# Patient Record
Sex: MALE | Race: WHITE | Employment: OTHER | URBAN - METROPOLITAN AREA
[De-identification: names, ages, dates, MRNs, and addresses within clinical notes are randomized per-mention and may not be internally consistent; named-entity substitution may affect disease eponyms.]

---

## 2017-06-05 NOTE — PATIENT DISCUSSION
(H25.13) Age-related nuclear cataract, bilateral - Assesment : Examination revealed cataract. - Plan : Monitor for changes. Advised patient to call our office with decreased vision or increased symptoms.  VF Rosalia jim

## 2017-06-05 NOTE — PATIENT DISCUSSION
(H40.013) Open angle with borderline findings, low risk, bilateral - Assesment : Examination revealed suspicion for Open Angle Glaucoma. IOP BORDERLINE ELEVATED TODAY.   RECOMMEND STARTING TOPICAL TX OU TO TRY AND LOWER IOP OU. - Plan : START LATANOPROST OU QD  THURSDAY / IOP CHECK

## 2017-06-05 NOTE — PATIENT DISCUSSION
(Y83.317) Vitreous degeneration, bilateral - Assesment : Examination revealed PVD - Plan : Monitor for changes. Advised patient to call our office with decreased vision or an increase in flashes and/or floaters.

## 2017-06-09 NOTE — PATIENT DISCUSSION
(H40.013) Open angle with borderline findings, low risk, bilateral - Assesment : Examination revealed suspicion for Open Angle Glaucoma. IOP much improved since starting latanoprost, recommend continuing gtts therapy. Advised patient even though iops are stable shes still at risk for narrow kushal glaucoma Advised to go to ER if any eye pain,nausea, redness, as this can possibly an angle clousure attack. Avoid taking medication that say not to take if you have glaucoma. - Plan : Monitor for changes.  Continue latanoprost qdaily OU Rtc as scheduled in Fall for LPI OU

## 2017-10-10 ENCOUNTER — FOLLOW UP (OUTPATIENT)
Dept: URBAN - METROPOLITAN AREA CLINIC 26 | Facility: CLINIC | Age: 72
End: 2017-10-10

## 2017-10-10 VITALS — SYSTOLIC BLOOD PRESSURE: 130 MMHG | DIASTOLIC BLOOD PRESSURE: 74 MMHG | HEIGHT: 60 IN | HEART RATE: 72 BPM

## 2017-10-10 DIAGNOSIS — H35.373: ICD-10-CM

## 2017-10-10 DIAGNOSIS — H40.1132: ICD-10-CM

## 2017-10-10 DIAGNOSIS — H43.813: ICD-10-CM

## 2017-10-10 DIAGNOSIS — D31.31: ICD-10-CM

## 2017-10-10 PROCEDURE — 92014 COMPRE OPH EXAM EST PT 1/>: CPT

## 2017-10-10 PROCEDURE — 76512 OPH US DX B-SCAN: CPT

## 2017-10-10 PROCEDURE — 92250 FUNDUS PHOTOGRAPHY W/I&R: CPT

## 2017-10-10 ASSESSMENT — VISUAL ACUITY
OS_CC: 20/25-2
OD_CC: 20/20

## 2017-10-10 ASSESSMENT — TONOMETRY
OS_IOP_MMHG: 11
OD_IOP_MMHG: 10

## 2018-10-09 ENCOUNTER — FOLLOW UP (OUTPATIENT)
Dept: URBAN - METROPOLITAN AREA CLINIC 26 | Facility: CLINIC | Age: 73
End: 2018-10-09

## 2018-10-09 VITALS — BODY MASS INDEX: 24.25 KG/M2 | WEIGHT: 160 LBS | HEIGHT: 68 IN

## 2018-10-09 DIAGNOSIS — D31.31: ICD-10-CM

## 2018-10-09 DIAGNOSIS — H40.1132: ICD-10-CM

## 2018-10-09 DIAGNOSIS — H43.813: ICD-10-CM

## 2018-10-09 DIAGNOSIS — H35.373: ICD-10-CM

## 2018-10-09 PROCEDURE — 76512 OPH US DX B-SCAN: CPT

## 2018-10-09 PROCEDURE — 92250 FUNDUS PHOTOGRAPHY W/I&R: CPT

## 2018-10-09 PROCEDURE — 92014 COMPRE OPH EXAM EST PT 1/>: CPT

## 2018-10-09 ASSESSMENT — VISUAL ACUITY
OD_CC: 20/20-1
OS_CC: 20/20-2

## 2018-10-09 ASSESSMENT — TONOMETRY
OD_IOP_MMHG: 12
OS_IOP_MMHG: 11

## 2019-10-08 ENCOUNTER — FOLLOW UP (OUTPATIENT)
Dept: URBAN - METROPOLITAN AREA CLINIC 26 | Facility: CLINIC | Age: 74
End: 2019-10-08

## 2019-10-08 VITALS — HEIGHT: 68 IN | BODY MASS INDEX: 23.49 KG/M2 | WEIGHT: 155 LBS

## 2019-10-08 DIAGNOSIS — H40.1132: ICD-10-CM

## 2019-10-08 DIAGNOSIS — H35.373: ICD-10-CM

## 2019-10-08 DIAGNOSIS — H43.813: ICD-10-CM

## 2019-10-08 DIAGNOSIS — D31.31: ICD-10-CM

## 2019-10-08 PROCEDURE — 92250 FUNDUS PHOTOGRAPHY W/I&R: CPT

## 2019-10-08 PROCEDURE — 92014 COMPRE OPH EXAM EST PT 1/>: CPT

## 2019-10-08 PROCEDURE — 76512 OPH US DX B-SCAN: CPT

## 2019-10-08 ASSESSMENT — VISUAL ACUITY
OD_CC: 20/20
OS_CC: 20/20-1

## 2019-10-08 ASSESSMENT — TONOMETRY
OD_IOP_MMHG: 17
OS_IOP_MMHG: 17

## 2020-09-01 ENCOUNTER — OFFICE VISIT (OUTPATIENT)
Age: 75
End: 2020-09-01

## 2020-10-01 ENCOUNTER — OFFICE VISIT (OUTPATIENT)
Age: 75
End: 2020-10-01

## 2021-02-06 ENCOUNTER — OFFICE VISIT (OUTPATIENT)
Age: 76
End: 2021-02-06

## 2021-04-01 ENCOUNTER — OFFICE VISIT (OUTPATIENT)
Dept: URBAN - METROPOLITAN AREA CLINIC 7 | Facility: CLINIC | Age: 76
End: 2021-04-01

## 2021-04-07 ENCOUNTER — TELEPHONE ENCOUNTER (OUTPATIENT)
Dept: URBAN - METROPOLITAN AREA CLINIC 9 | Facility: CLINIC | Age: 76
End: 2021-04-07

## 2021-04-09 ENCOUNTER — OFFICE VISIT (OUTPATIENT)
Dept: URBAN - METROPOLITAN AREA SURGERY CENTER 5 | Facility: SURGERY CENTER | Age: 76
End: 2021-04-09

## 2021-04-20 ENCOUNTER — OFFICE VISIT (OUTPATIENT)
Dept: URBAN - METROPOLITAN AREA SURGERY CENTER 5 | Facility: SURGERY CENTER | Age: 76
End: 2021-04-20

## 2021-05-11 ENCOUNTER — OFFICE VISIT (OUTPATIENT)
Dept: URBAN - METROPOLITAN AREA CLINIC 7 | Facility: CLINIC | Age: 76
End: 2021-05-11

## 2021-05-26 LAB — PANCREATIC ELASTASE-1: (no result)

## 2021-06-07 ENCOUNTER — OFFICE VISIT (OUTPATIENT)
Dept: URBAN - METROPOLITAN AREA CLINIC 7 | Facility: CLINIC | Age: 76
End: 2021-06-07

## 2021-06-23 ENCOUNTER — TELEPHONE ENCOUNTER (OUTPATIENT)
Dept: URBAN - METROPOLITAN AREA CLINIC 9 | Facility: CLINIC | Age: 76
End: 2021-06-23

## 2021-07-08 ENCOUNTER — TELEPHONE ENCOUNTER (OUTPATIENT)
Dept: URBAN - METROPOLITAN AREA CLINIC 9 | Facility: CLINIC | Age: 76
End: 2021-07-08

## 2021-08-23 ENCOUNTER — TELEPHONE ENCOUNTER (OUTPATIENT)
Dept: URBAN - METROPOLITAN AREA CLINIC 9 | Facility: CLINIC | Age: 76
End: 2021-08-23

## 2021-08-26 ENCOUNTER — OFFICE VISIT (OUTPATIENT)
Dept: URBAN - METROPOLITAN AREA CLINIC 7 | Facility: CLINIC | Age: 76
End: 2021-08-26

## 2021-09-17 ENCOUNTER — OFFICE VISIT (OUTPATIENT)
Dept: URBAN - METROPOLITAN AREA CLINIC 7 | Facility: CLINIC | Age: 76
End: 2021-09-17

## 2021-10-07 ENCOUNTER — TELEPHONE ENCOUNTER (OUTPATIENT)
Dept: URBAN - METROPOLITAN AREA CLINIC 9 | Facility: CLINIC | Age: 76
End: 2021-10-07

## 2021-10-08 ENCOUNTER — TELEPHONE ENCOUNTER (OUTPATIENT)
Dept: URBAN - METROPOLITAN AREA CLINIC 9 | Facility: CLINIC | Age: 76
End: 2021-10-08

## 2021-10-13 ENCOUNTER — TELEPHONE ENCOUNTER (OUTPATIENT)
Dept: URBAN - METROPOLITAN AREA CLINIC 9 | Facility: CLINIC | Age: 76
End: 2021-10-13

## 2021-10-14 ENCOUNTER — TELEPHONE ENCOUNTER (OUTPATIENT)
Dept: URBAN - METROPOLITAN AREA CLINIC 9 | Facility: CLINIC | Age: 76
End: 2021-10-14

## 2021-10-15 ENCOUNTER — TELEPHONE ENCOUNTER (OUTPATIENT)
Dept: URBAN - METROPOLITAN AREA CLINIC 9 | Facility: CLINIC | Age: 76
End: 2021-10-15

## 2021-12-14 ENCOUNTER — TELEPHONE ENCOUNTER (OUTPATIENT)
Dept: URBAN - METROPOLITAN AREA CLINIC 9 | Facility: CLINIC | Age: 76
End: 2021-12-14

## 2022-01-04 ENCOUNTER — TELEPHONE ENCOUNTER (OUTPATIENT)
Dept: URBAN - METROPOLITAN AREA CLINIC 9 | Facility: CLINIC | Age: 77
End: 2022-01-04

## 2022-01-06 ENCOUNTER — TELEPHONE ENCOUNTER (OUTPATIENT)
Dept: URBAN - METROPOLITAN AREA CLINIC 9 | Facility: CLINIC | Age: 77
End: 2022-01-06

## 2022-01-10 ENCOUNTER — OFFICE VISIT (OUTPATIENT)
Dept: URBAN - METROPOLITAN AREA CLINIC 7 | Facility: CLINIC | Age: 77
End: 2022-01-10

## 2022-01-12 ENCOUNTER — OFFICE VISIT (OUTPATIENT)
Dept: URBAN - METROPOLITAN AREA CLINIC 7 | Facility: CLINIC | Age: 77
End: 2022-01-12

## 2022-07-09 ENCOUNTER — TELEPHONE ENCOUNTER (OUTPATIENT)
Dept: URBAN - METROPOLITAN AREA CLINIC 121 | Facility: CLINIC | Age: 77
End: 2022-07-09

## 2022-07-09 RX ORDER — CHROMIUM 200 MCG
TABLET ORAL
Refills: 0 | OUTPATIENT
Start: 2014-02-20 | End: 2017-01-10

## 2022-07-09 RX ORDER — SIMVASTATIN 40 MG/1
TABLET, FILM COATED ORAL ONCE A DAY
Refills: 0 | OUTPATIENT
Start: 2017-02-24 | End: 2017-03-10

## 2022-07-09 RX ORDER — OMEPRAZOLE 40 MG/1
CAPSULE, DELAYED RELEASE ORAL
Refills: 0 | OUTPATIENT
Start: 2010-10-21 | End: 2014-02-20

## 2022-07-09 RX ORDER — ASPIRIN 81 MG/1
TABLET, COATED ORAL
Refills: 0 | OUTPATIENT
Start: 2010-10-21 | End: 2014-02-20

## 2022-07-09 RX ORDER — LISINOPRIL 10 MG/1
TABLET ORAL
Refills: 0 | OUTPATIENT
Start: 2014-02-20 | End: 2017-02-24

## 2022-07-09 RX ORDER — NAPROXEN 500 MG/1
TABLET ORAL ONCE A DAY
Refills: 0 | OUTPATIENT
Start: 2017-01-10 | End: 2017-02-24

## 2022-07-09 RX ORDER — SIMVASTATIN 40 MG/1
TABLET, FILM COATED ORAL
Refills: 0 | OUTPATIENT
Start: 2014-02-20 | End: 2017-02-24

## 2022-07-09 RX ORDER — ESZOPICLONE 1 MG/1
TABLET, COATED ORAL TAKE AS DIRECTED
Refills: 0 | OUTPATIENT
Start: 2017-02-24 | End: 2017-03-10

## 2022-07-09 RX ORDER — NAPROXEN 500 MG/1
TABLET ORAL ONCE A DAY
Refills: 0 | OUTPATIENT
Start: 2017-02-24 | End: 2017-03-10

## 2022-07-09 RX ORDER — SIMVASTATIN 40 MG/1
TABLET, FILM COATED ORAL
Refills: 0 | OUTPATIENT
Start: 2010-10-21 | End: 2014-02-20

## 2022-07-09 RX ORDER — LISINOPRIL 10 MG/1
TABLET ORAL ONCE A DAY
Refills: 0 | OUTPATIENT
Start: 2017-02-24 | End: 2017-03-10

## 2022-07-09 RX ORDER — FLUTICASONE PROPIONATE 50 UG/1
SPRAY, METERED NASAL
Refills: 0 | OUTPATIENT
Start: 2014-01-20 | End: 2014-02-20

## 2022-07-09 RX ORDER — LISINOPRIL 10 MG/1
TABLET ORAL ONCE A DAY
Refills: 0 | OUTPATIENT
Start: 2017-03-10 | End: 2017-03-10

## 2022-07-09 RX ORDER — NAPROXEN 500 MG/1
TABLET ORAL TWICE A DAY
Refills: 0 | OUTPATIENT
Start: 2014-11-21 | End: 2017-01-10

## 2022-07-09 RX ORDER — CHOLECALCIFEROL (VITAMIN D3) 25 MCG
TABLET,CHEWABLE ORAL
Refills: 0 | OUTPATIENT
Start: 2010-10-21 | End: 2014-02-20

## 2022-07-09 RX ORDER — LATANOPROST/PF 0.005 %
DROPS OPHTHALMIC (EYE)
Refills: 0 | OUTPATIENT
Start: 2014-11-21 | End: 2017-01-10

## 2022-07-09 RX ORDER — ZOLPIDEM TARTRATE 10 MG/1
TABLET, FILM COATED ORAL
Refills: 0 | OUTPATIENT
Start: 2014-11-21 | End: 2017-01-10

## 2022-07-09 RX ORDER — GLUCOSAM/MSM/CHOND/HRB149/HYAL 500-500 MG
TABLET ORAL
Refills: 0 | OUTPATIENT
Start: 2014-02-20 | End: 2017-01-10

## 2022-07-09 RX ORDER — ESZOPICLONE 1 MG/1
TABLET, COATED ORAL TAKE AS DIRECTED
Refills: 0 | OUTPATIENT
Start: 2017-01-10 | End: 2017-02-24

## 2022-07-09 RX ORDER — OMEGA-3S/DHA/EPA/FISH OIL 980-1400MG
CAPSULE,DELAYED RELEASE (ENTERIC COATED) ORAL
Refills: 0 | OUTPATIENT
Start: 2010-10-21 | End: 2014-02-20

## 2022-07-09 RX ORDER — BIMATOPROST 0.1 MG/ML
SOLUTION/ DROPS OPHTHALMIC
Refills: 0 | OUTPATIENT
Start: 2014-02-20 | End: 2014-11-21

## 2022-07-09 RX ORDER — OMEGA-3S/DHA/EPA/FISH OIL 980-1400MG
CAPSULE,DELAYED RELEASE (ENTERIC COATED) ORAL
Refills: 0 | OUTPATIENT
Start: 2014-02-20 | End: 2017-02-24

## 2022-07-10 ENCOUNTER — TELEPHONE ENCOUNTER (OUTPATIENT)
Dept: URBAN - METROPOLITAN AREA CLINIC 121 | Facility: CLINIC | Age: 77
End: 2022-07-10

## 2022-07-10 RX ORDER — NAPROXEN 500 MG/1
TABLET ORAL ONCE A DAY
Refills: 0 | Status: ACTIVE | COMMUNITY
Start: 2017-03-10

## 2022-07-10 RX ORDER — RIFAXIMIN 550 MG/1
TWICE A DAY TABLET ORAL TWICE A DAY
Refills: 0 | Status: ACTIVE | COMMUNITY
Start: 2017-02-24

## 2022-07-10 RX ORDER — ESOMEPRAZOLE MAGNESIUM 20 MG/1
CAPSULE, DELAYED RELEASE ORAL TAKE AS DIRECTED
Refills: 0 | Status: ACTIVE | COMMUNITY
Start: 2017-03-10

## 2022-07-10 RX ORDER — ASPIRIN 81 MG/1
TABLET, COATED ORAL
Refills: 0 | Status: ACTIVE | COMMUNITY
Start: 2014-02-20

## 2022-07-10 RX ORDER — LISINOPRIL 40 MG/1
TABLET ORAL ONCE A DAY
Refills: 0 | Status: ACTIVE | COMMUNITY
Start: 2017-03-10

## 2022-07-10 RX ORDER — SIMVASTATIN 40 MG/1
TABLET, FILM COATED ORAL ONCE A DAY
Refills: 0 | Status: ACTIVE | COMMUNITY
Start: 2017-03-10

## 2022-07-10 RX ORDER — RIFAXIMIN 550 MG/1
TWICE A DAY TABLET ORAL TWICE A DAY
Refills: 0 | Status: ACTIVE | COMMUNITY
Start: 2017-02-27

## 2022-07-10 RX ORDER — RIFAXIMIN 550 MG/1
TWICE A DAY TABLET ORAL TWICE A DAY
Refills: 0 | Status: ACTIVE | COMMUNITY
Start: 2017-03-06

## 2022-07-10 RX ORDER — ESZOPICLONE 1 MG/1
TABLET, COATED ORAL TAKE AS DIRECTED
Refills: 0 | Status: ACTIVE | COMMUNITY
Start: 2017-03-10

## 2022-07-10 RX ORDER — OMEGA-3S/DHA/EPA/FISH OIL 980-1400MG
CAPSULE,DELAYED RELEASE (ENTERIC COATED) ORAL ONCE A DAY
Refills: 0 | Status: ACTIVE | COMMUNITY
Start: 2017-02-24

## 2022-07-30 ENCOUNTER — TELEPHONE ENCOUNTER (OUTPATIENT)
Age: 77
End: 2022-07-30

## 2022-07-30 RX ORDER — WHEAT DEXTRIN 3 G/4 G
1 (ONE) POWDER (GRAM) ORAL
Qty: 0 | Refills: 2 | OUTPATIENT
Start: 2021-04-19 | End: 2021-05-19

## 2022-07-30 RX ORDER — LORATADINE 5 MG
17 GRAMS TABLET,CHEWABLE ORAL DAILY
Qty: 0 | Refills: 16 | OUTPATIENT
Start: 2021-04-19 | End: 2022-01-10

## 2022-07-30 RX ORDER — RIFAXIMIN 550 MG/1
1 (ONE) TABLET ORAL
Qty: 0 | Refills: 2 | OUTPATIENT
Start: 2021-09-17 | End: 2021-10-01

## 2022-07-30 RX ORDER — FLUCONAZOLE 200 MG/1
1 (ONE) TABLET ORAL DAILY
Qty: 0 | Refills: 2 | OUTPATIENT
Start: 2021-07-08 | End: 2021-07-22

## 2022-07-31 ENCOUNTER — TELEPHONE ENCOUNTER (OUTPATIENT)
Age: 77
End: 2022-07-31

## 2022-07-31 RX ORDER — LORATADINE 5 MG
17 GRAMS TABLET,CHEWABLE ORAL DAILY
Qty: 0 | Refills: 16 | Status: ACTIVE | COMMUNITY
Start: 2021-04-19

## 2022-07-31 RX ORDER — WHEAT DEXTRIN 3 G/4 G
1 (ONE) POWDER (GRAM) ORAL
Qty: 0 | Refills: 2 | Status: ACTIVE | COMMUNITY
Start: 2021-04-19

## 2022-07-31 RX ORDER — RIFAXIMIN 550 MG/1
1 (ONE) TABLET ORAL
Qty: 0 | Refills: 2 | Status: ACTIVE | COMMUNITY
Start: 2021-09-17

## 2022-07-31 RX ORDER — FLUCONAZOLE 200 MG/1
1 (ONE) TABLET ORAL DAILY
Qty: 0 | Refills: 2 | Status: ACTIVE | COMMUNITY
Start: 2021-07-08

## 2022-07-31 RX ORDER — BUSPIRONE HYDROCHLORIDE 5 MG/1
1 (ONE) TABLET ORAL AS DIRECTED
Qty: 0 | Refills: 3 | Status: ACTIVE | COMMUNITY
Start: 2021-07-20

## 2023-06-15 ENCOUNTER — TELEPHONE ENCOUNTER (OUTPATIENT)
Dept: URBAN - METROPOLITAN AREA CLINIC 7 | Facility: CLINIC | Age: 78
End: 2023-06-15

## 2023-06-16 ENCOUNTER — LAB OUTSIDE AN ENCOUNTER (OUTPATIENT)
Dept: URBAN - METROPOLITAN AREA CLINIC 7 | Facility: CLINIC | Age: 78
End: 2023-06-16

## 2023-06-16 ENCOUNTER — DASHBOARD ENCOUNTERS (OUTPATIENT)
Age: 78
End: 2023-06-16

## 2023-06-16 ENCOUNTER — OFFICE VISIT (OUTPATIENT)
Dept: URBAN - METROPOLITAN AREA CLINIC 7 | Facility: CLINIC | Age: 78
End: 2023-06-16
Payer: MEDICARE

## 2023-06-16 VITALS
TEMPERATURE: 97.7 F | WEIGHT: 144 LBS | SYSTOLIC BLOOD PRESSURE: 120 MMHG | RESPIRATION RATE: 16 BRPM | BODY MASS INDEX: 21.82 KG/M2 | HEIGHT: 68 IN | DIASTOLIC BLOOD PRESSURE: 70 MMHG

## 2023-06-16 DIAGNOSIS — R14.0 ABDOMINAL DISTENTION: ICD-10-CM

## 2023-06-16 DIAGNOSIS — K63.89 SMALL INTESTINAL BACTERIAL OVERGROWTH (SIBO): ICD-10-CM

## 2023-06-16 DIAGNOSIS — R10.9 ABDOMINAL DISCOMFORT: ICD-10-CM

## 2023-06-16 DIAGNOSIS — K59.00 CONSTIPATION: ICD-10-CM

## 2023-06-16 DIAGNOSIS — R10.84 GENERALIZED ABDOMINAL PAIN: ICD-10-CM

## 2023-06-16 DIAGNOSIS — Z86.010 PERSONAL HISTORY OF COLONIC POLYPS: ICD-10-CM

## 2023-06-16 PROCEDURE — 99214 OFFICE O/P EST MOD 30 MIN: CPT | Performed by: INTERNAL MEDICINE

## 2023-06-16 RX ORDER — OMEGA-3S/DHA/EPA/FISH OIL 980-1400MG
CAPSULE,DELAYED RELEASE (ENTERIC COATED) ORAL ONCE A DAY
Refills: 0 | Status: ACTIVE | COMMUNITY
Start: 2017-02-24

## 2023-06-16 RX ORDER — NAPROXEN 500 MG/1
TABLET ORAL ONCE A DAY
Refills: 0 | Status: ACTIVE | COMMUNITY
Start: 2017-03-10

## 2023-06-16 RX ORDER — LISINOPRIL 40 MG/1
TABLET ORAL ONCE A DAY
Refills: 0 | Status: ACTIVE | COMMUNITY
Start: 2017-03-10

## 2023-06-16 RX ORDER — ESOMEPRAZOLE MAGNESIUM 20 MG/1
CAPSULE, DELAYED RELEASE ORAL TAKE AS DIRECTED
Refills: 0 | Status: ACTIVE | COMMUNITY
Start: 2017-03-10

## 2023-06-16 RX ORDER — WHEAT DEXTRIN 3 G/4 G
1 (ONE) POWDER (GRAM) ORAL
Qty: 0 | Refills: 2 | Status: ACTIVE | COMMUNITY
Start: 2021-04-19

## 2023-06-16 RX ORDER — BUSPIRONE HYDROCHLORIDE 5 MG/1
1 (ONE) TABLET ORAL AS DIRECTED
Qty: 0 | Refills: 3 | Status: ACTIVE | COMMUNITY
Start: 2021-07-20

## 2023-06-16 RX ORDER — FLUCONAZOLE 200 MG/1
1 (ONE) TABLET ORAL DAILY
Qty: 0 | Refills: 2 | Status: ACTIVE | COMMUNITY
Start: 2021-07-08

## 2023-06-16 RX ORDER — ESZOPICLONE 1 MG/1
TABLET, COATED ORAL TAKE AS DIRECTED
Refills: 0 | Status: ACTIVE | COMMUNITY
Start: 2017-03-10

## 2023-06-16 RX ORDER — RIFAXIMIN 550 MG/1
1 (ONE) TABLET ORAL
Qty: 0 | Refills: 2 | Status: ACTIVE | COMMUNITY
Start: 2021-09-17

## 2023-06-16 RX ORDER — LORATADINE 5 MG
17 GRAMS TABLET,CHEWABLE ORAL DAILY
Qty: 0 | Refills: 16 | Status: ACTIVE | COMMUNITY
Start: 2021-04-19

## 2023-06-16 RX ORDER — ASPIRIN 81 MG/1
TABLET, COATED ORAL
Refills: 0 | Status: ACTIVE | COMMUNITY
Start: 2014-02-20

## 2023-06-16 RX ORDER — SIMVASTATIN 40 MG/1
TABLET, FILM COATED ORAL ONCE A DAY
Refills: 0 | Status: ACTIVE | COMMUNITY
Start: 2017-03-10

## 2023-06-16 NOTE — HPI-TODAY'S VISIT:
Patient was last seen in the office back in January 2022.  I have been following him for symptoms of abdominal discomfort, distention, bloating, and overall functional bloating and possible small intestinal bacterial overgrowth.  I ultimately did refer him to nutrition and he did have a commercial microbiome panel run which demonstrated evidence of dysbiosis.  It was thought that maybe he had a microbiome deficiency.  Pancreatic elastase was normal, calprotectin normal.  No pathologic bacteria identified.  She did have bacterial sensitivities to Cipro tetracycline and Bactrim.  His evaluation is overall largely improved persistent bloating discomfort fullness pressure.  CT scan of the abdomen pelvis in April 2021 demonstrated no bowel inflammation no lymphadenopathy small left inguinal hernia liver cyst small gallstones and moderate to large stool burden with diverticulosis and enlarged prostate.  CBC and CMP were normal.  EGD April 2021 was largely normal except for mild gastric atrophy with negative biopsies and a colonoscopy with 6 tubular adenomas diverticulosis and medium sized internal hemorrhoids.  He was treated with Benefiber and MiraLAX as well as Beano.  Elastase normal.  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  Normal gastric emptying study in September 2021.  He was treated with a SIBO hydrogen regimen as well as with fluconazole for SIBO and a course of rifaximin but still continued to have symptoms with no significant improvement.  Also tried BuSpar with no effect.  No bleeding.  No weight loss.  No GI bleeding.  HIDA scan in October 2021 was normal.  I did refer him to nutrition at that point for input on dietary measures and possible complementary testing.  Plan at that time was to get an abdominal MRI given he was having lower abdominal protrusion which I thought was secondary to diastases recti.  It is not clear that this study was ever done.  He tells me he has not made much progress with dietician. He has lost about 8 lbs since last visit. He has received antibiotics 4 times per day for dental issues. He is constipated, pelleted stools, bloating, distention, symptoms remain very similar. He is on miracle fiber, and probiotics. He had a seen a functional medicine doctor.

## 2023-06-30 ENCOUNTER — TELEPHONE ENCOUNTER (OUTPATIENT)
Dept: URBAN - METROPOLITAN AREA CLINIC 7 | Facility: CLINIC | Age: 78
End: 2023-06-30

## 2023-06-30 ENCOUNTER — WEB ENCOUNTER (OUTPATIENT)
Dept: URBAN - METROPOLITAN AREA CLINIC 7 | Facility: CLINIC | Age: 78
End: 2023-06-30

## 2023-06-30 RX ORDER — BUSPIRONE HYDROCHLORIDE 5 MG/1
1 (ONE) TABLET ORAL AS DIRECTED
Qty: 0 | Refills: 3 | Status: ACTIVE | COMMUNITY
Start: 2021-07-20

## 2023-06-30 RX ORDER — ESZOPICLONE 1 MG/1
TABLET, COATED ORAL TAKE AS DIRECTED
Refills: 0 | Status: ACTIVE | COMMUNITY
Start: 2017-03-10

## 2023-06-30 RX ORDER — LORATADINE 5 MG
17 GRAMS TABLET,CHEWABLE ORAL DAILY
Qty: 0 | Refills: 16 | Status: ACTIVE | COMMUNITY
Start: 2021-04-19

## 2023-06-30 RX ORDER — LISINOPRIL 40 MG/1
TABLET ORAL ONCE A DAY
Refills: 0 | Status: ACTIVE | COMMUNITY
Start: 2017-03-10

## 2023-06-30 RX ORDER — NAPROXEN 500 MG/1
TABLET ORAL ONCE A DAY
Refills: 0 | Status: ACTIVE | COMMUNITY
Start: 2017-03-10

## 2023-06-30 RX ORDER — ASPIRIN 81 MG/1
TABLET, COATED ORAL
Refills: 0 | Status: ACTIVE | COMMUNITY
Start: 2014-02-20

## 2023-06-30 RX ORDER — SIMVASTATIN 40 MG/1
TABLET, FILM COATED ORAL ONCE A DAY
Refills: 0 | Status: ACTIVE | COMMUNITY
Start: 2017-03-10

## 2023-06-30 RX ORDER — OMEGA-3S/DHA/EPA/FISH OIL 980-1400MG
CAPSULE,DELAYED RELEASE (ENTERIC COATED) ORAL ONCE A DAY
Refills: 0 | Status: ACTIVE | COMMUNITY
Start: 2017-02-24

## 2023-06-30 RX ORDER — ESOMEPRAZOLE MAGNESIUM 20 MG/1
CAPSULE, DELAYED RELEASE ORAL TAKE AS DIRECTED
Refills: 0 | Status: ACTIVE | COMMUNITY
Start: 2017-03-10

## 2023-06-30 RX ORDER — RIFAXIMIN 550 MG/1
1 (ONE) TABLET ORAL
Qty: 0 | Refills: 2 | Status: ACTIVE | COMMUNITY
Start: 2021-09-17

## 2023-06-30 RX ORDER — PANCRELIPASE LIPASE, PANCRELIPASE PROTEASE, PANCRELIPASE AMYLASE 25000; 79000; 105000 [USP'U]/1; [USP'U]/1; [USP'U]/1
AS DIRECTED CAPSULE, DELAYED RELEASE ORAL
Qty: 180 | Refills: 3 | OUTPATIENT
Start: 2023-07-05 | End: 2023-11-02

## 2023-06-30 RX ORDER — WHEAT DEXTRIN 3 G/4 G
1 (ONE) POWDER (GRAM) ORAL
Qty: 0 | Refills: 2 | Status: ACTIVE | COMMUNITY
Start: 2021-04-19

## 2023-06-30 RX ORDER — FLUCONAZOLE 200 MG/1
1 (ONE) TABLET ORAL DAILY
Qty: 0 | Refills: 2 | Status: ACTIVE | COMMUNITY
Start: 2021-07-08

## 2023-07-05 ENCOUNTER — TELEPHONE ENCOUNTER (OUTPATIENT)
Dept: URBAN - METROPOLITAN AREA CLINIC 7 | Facility: CLINIC | Age: 78
End: 2023-07-05

## 2023-07-06 ENCOUNTER — WEB ENCOUNTER (OUTPATIENT)
Dept: URBAN - METROPOLITAN AREA CLINIC 7 | Facility: CLINIC | Age: 78
End: 2023-07-06

## 2023-07-07 ENCOUNTER — WEB ENCOUNTER (OUTPATIENT)
Dept: URBAN - METROPOLITAN AREA CLINIC 7 | Facility: CLINIC | Age: 78
End: 2023-07-07